# Patient Record
Sex: FEMALE | Race: WHITE | NOT HISPANIC OR LATINO | URBAN - METROPOLITAN AREA
[De-identification: names, ages, dates, MRNs, and addresses within clinical notes are randomized per-mention and may not be internally consistent; named-entity substitution may affect disease eponyms.]

---

## 2017-05-11 ENCOUNTER — OUTPATIENT (OUTPATIENT)
Dept: OUTPATIENT SERVICES | Facility: HOSPITAL | Age: 52
LOS: 1 days | Discharge: HOME | End: 2017-05-11

## 2017-05-18 ENCOUNTER — OUTPATIENT (OUTPATIENT)
Dept: OUTPATIENT SERVICES | Facility: HOSPITAL | Age: 52
LOS: 1 days | Discharge: HOME | End: 2017-05-18

## 2017-05-24 ENCOUNTER — OUTPATIENT (OUTPATIENT)
Dept: OUTPATIENT SERVICES | Facility: HOSPITAL | Age: 52
LOS: 1 days | Discharge: HOME | End: 2017-05-24

## 2017-06-28 DIAGNOSIS — K64.8 OTHER HEMORRHOIDS: ICD-10-CM

## 2017-06-28 DIAGNOSIS — D12.5 BENIGN NEOPLASM OF SIGMOID COLON: ICD-10-CM

## 2017-06-28 DIAGNOSIS — K52.9 NONINFECTIVE GASTROENTERITIS AND COLITIS, UNSPECIFIED: ICD-10-CM

## 2017-06-28 DIAGNOSIS — Z88.0 ALLERGY STATUS TO PENICILLIN: ICD-10-CM

## 2017-06-28 DIAGNOSIS — R19.7 DIARRHEA, UNSPECIFIED: ICD-10-CM

## 2017-06-28 DIAGNOSIS — Z91.040 LATEX ALLERGY STATUS: ICD-10-CM

## 2017-06-28 DIAGNOSIS — K57.30 DIVERTICULOSIS OF LARGE INTESTINE WITHOUT PERFORATION OR ABSCESS WITHOUT BLEEDING: ICD-10-CM

## 2017-07-28 DIAGNOSIS — D49.0 NEOPLASM OF UNSPECIFIED BEHAVIOR OF DIGESTIVE SYSTEM: ICD-10-CM

## 2017-07-28 DIAGNOSIS — K50.10 CROHN'S DISEASE OF LARGE INTESTINE WITHOUT COMPLICATIONS: ICD-10-CM

## 2017-09-16 ENCOUNTER — OUTPATIENT (OUTPATIENT)
Dept: OUTPATIENT SERVICES | Facility: HOSPITAL | Age: 52
LOS: 1 days | Discharge: HOME | End: 2017-09-16

## 2017-09-16 DIAGNOSIS — R00.1 BRADYCARDIA, UNSPECIFIED: ICD-10-CM

## 2017-09-16 DIAGNOSIS — R07.9 CHEST PAIN, UNSPECIFIED: ICD-10-CM

## 2017-09-16 DIAGNOSIS — K04.7 PERIAPICAL ABSCESS WITHOUT SINUS: ICD-10-CM

## 2017-09-16 DIAGNOSIS — K52.9 NONINFECTIVE GASTROENTERITIS AND COLITIS, UNSPECIFIED: ICD-10-CM

## 2017-09-24 ENCOUNTER — INPATIENT (INPATIENT)
Facility: HOSPITAL | Age: 52
LOS: 3 days | Discharge: HOME | End: 2017-09-28
Attending: INTERNAL MEDICINE | Admitting: INTERNAL MEDICINE

## 2017-09-24 DIAGNOSIS — R07.9 CHEST PAIN, UNSPECIFIED: ICD-10-CM

## 2017-09-24 DIAGNOSIS — R00.1 BRADYCARDIA, UNSPECIFIED: ICD-10-CM

## 2017-09-24 DIAGNOSIS — K04.7 PERIAPICAL ABSCESS WITHOUT SINUS: ICD-10-CM

## 2017-10-02 DIAGNOSIS — R07.9 CHEST PAIN, UNSPECIFIED: ICD-10-CM

## 2017-10-02 DIAGNOSIS — J45.909 UNSPECIFIED ASTHMA, UNCOMPLICATED: ICD-10-CM

## 2017-10-02 DIAGNOSIS — E03.9 HYPOTHYROIDISM, UNSPECIFIED: ICD-10-CM

## 2017-10-02 DIAGNOSIS — K27.9 PEPTIC ULCER, SITE UNSPECIFIED, UNSPECIFIED AS ACUTE OR CHRONIC, WITHOUT HEMORRHAGE OR PERFORATION: ICD-10-CM

## 2017-10-02 DIAGNOSIS — K44.9 DIAPHRAGMATIC HERNIA WITHOUT OBSTRUCTION OR GANGRENE: ICD-10-CM

## 2017-10-02 DIAGNOSIS — F17.200 NICOTINE DEPENDENCE, UNSPECIFIED, UNCOMPLICATED: ICD-10-CM

## 2017-10-02 DIAGNOSIS — K58.9 IRRITABLE BOWEL SYNDROME WITHOUT DIARRHEA: ICD-10-CM

## 2017-10-02 DIAGNOSIS — M54.2 CERVICALGIA: ICD-10-CM

## 2017-10-02 DIAGNOSIS — R00.1 BRADYCARDIA, UNSPECIFIED: ICD-10-CM

## 2017-12-04 DIAGNOSIS — A09 INFECTIOUS GASTROENTERITIS AND COLITIS, UNSPECIFIED: ICD-10-CM

## 2018-03-08 PROBLEM — Z00.00 ENCOUNTER FOR PREVENTIVE HEALTH EXAMINATION: Status: ACTIVE | Noted: 2018-03-08

## 2018-03-10 ENCOUNTER — OUTPATIENT (OUTPATIENT)
Dept: OUTPATIENT SERVICES | Facility: HOSPITAL | Age: 53
LOS: 1 days | Discharge: HOME | End: 2018-03-10

## 2018-03-10 DIAGNOSIS — J68.0 BRONCHITIS AND PNEUMONITIS DUE TO CHEMICALS, GASES, FUMES AND VAPORS: ICD-10-CM

## 2018-03-12 ENCOUNTER — APPOINTMENT (OUTPATIENT)
Dept: OTOLARYNGOLOGY | Facility: CLINIC | Age: 53
End: 2018-03-12
Payer: COMMERCIAL

## 2018-03-12 VITALS — HEIGHT: 62.5 IN | BODY MASS INDEX: 22.43 KG/M2 | WEIGHT: 125 LBS

## 2018-03-12 DIAGNOSIS — R51 HEADACHE: ICD-10-CM

## 2018-03-12 DIAGNOSIS — H61.23 IMPACTED CERUMEN, BILATERAL: ICD-10-CM

## 2018-03-12 DIAGNOSIS — J34.89 OTHER SPECIFIED DISORDERS OF NOSE AND NASAL SINUSES: ICD-10-CM

## 2018-03-12 PROCEDURE — 99204 OFFICE O/P NEW MOD 45 MIN: CPT | Mod: 25

## 2018-03-12 PROCEDURE — 69210 REMOVE IMPACTED EAR WAX UNI: CPT

## 2018-03-12 PROCEDURE — 31231 NASAL ENDOSCOPY DX: CPT

## 2018-03-17 ENCOUNTER — EMERGENCY (EMERGENCY)
Facility: HOSPITAL | Age: 53
LOS: 0 days | Discharge: HOME | End: 2018-03-17
Attending: EMERGENCY MEDICINE | Admitting: SPECIALIST

## 2018-03-17 VITALS
TEMPERATURE: 98 F | DIASTOLIC BLOOD PRESSURE: 86 MMHG | OXYGEN SATURATION: 98 % | HEART RATE: 59 BPM | SYSTOLIC BLOOD PRESSURE: 141 MMHG | RESPIRATION RATE: 16 BRPM

## 2018-03-17 VITALS
OXYGEN SATURATION: 98 % | RESPIRATION RATE: 20 BRPM | HEART RATE: 106 BPM | TEMPERATURE: 98 F | SYSTOLIC BLOOD PRESSURE: 151 MMHG | DIASTOLIC BLOOD PRESSURE: 73 MMHG

## 2018-03-17 DIAGNOSIS — Z88.8 ALLERGY STATUS TO OTHER DRUGS, MEDICAMENTS AND BIOLOGICAL SUBSTANCES: ICD-10-CM

## 2018-03-17 DIAGNOSIS — M27.2 INFLAMMATORY CONDITIONS OF JAWS: ICD-10-CM

## 2018-03-17 DIAGNOSIS — K08.89 OTHER SPECIFIED DISORDERS OF TEETH AND SUPPORTING STRUCTURES: ICD-10-CM

## 2018-03-17 DIAGNOSIS — Z88.0 ALLERGY STATUS TO PENICILLIN: ICD-10-CM

## 2018-03-17 DIAGNOSIS — T85.848S PAIN DUE TO OTHER INTERNAL PROSTHETIC DEVICES, IMPLANTS AND GRAFTS, SEQUELA: Chronic | ICD-10-CM

## 2018-03-17 LAB
ANION GAP SERPL CALC-SCNC: 13 MMOL/L — SIGNIFICANT CHANGE UP (ref 7–14)
APTT BLD: 24.8 SEC — LOW (ref 27–39.2)
BASOPHILS # BLD AUTO: 0.01 K/UL — SIGNIFICANT CHANGE UP (ref 0–0.2)
BASOPHILS NFR BLD AUTO: 0.1 % — SIGNIFICANT CHANGE UP (ref 0–1)
BUN SERPL-MCNC: 17 MG/DL — SIGNIFICANT CHANGE UP (ref 10–20)
CALCIUM SERPL-MCNC: 9.2 MG/DL — SIGNIFICANT CHANGE UP (ref 8.5–10.1)
CHLORIDE SERPL-SCNC: 102 MMOL/L — SIGNIFICANT CHANGE UP (ref 98–110)
CO2 SERPL-SCNC: 28 MMOL/L — SIGNIFICANT CHANGE UP (ref 17–32)
CREAT SERPL-MCNC: 0.8 MG/DL — SIGNIFICANT CHANGE UP (ref 0.7–1.5)
EOSINOPHIL # BLD AUTO: 0.08 K/UL — SIGNIFICANT CHANGE UP (ref 0–0.7)
EOSINOPHIL NFR BLD AUTO: 1.1 % — SIGNIFICANT CHANGE UP (ref 0–8)
GLUCOSE SERPL-MCNC: 107 MG/DL — SIGNIFICANT CHANGE UP (ref 70–110)
HCG SERPL QL: NEGATIVE — SIGNIFICANT CHANGE UP
HCT VFR BLD CALC: 40 % — SIGNIFICANT CHANGE UP (ref 37–47)
HGB BLD-MCNC: 13.4 G/DL — SIGNIFICANT CHANGE UP (ref 12–16)
IMM GRANULOCYTES NFR BLD AUTO: 0.4 % — HIGH (ref 0.1–0.3)
INR BLD: 1.05 RATIO — SIGNIFICANT CHANGE UP (ref 0.65–1.3)
LYMPHOCYTES # BLD AUTO: 1.52 K/UL — SIGNIFICANT CHANGE UP (ref 1.2–3.4)
LYMPHOCYTES # BLD AUTO: 21 % — SIGNIFICANT CHANGE UP (ref 20.5–51.1)
MCHC RBC-ENTMCNC: 28.6 PG — SIGNIFICANT CHANGE UP (ref 27–31)
MCHC RBC-ENTMCNC: 33.5 G/DL — SIGNIFICANT CHANGE UP (ref 32–37)
MCV RBC AUTO: 85.5 FL — SIGNIFICANT CHANGE UP (ref 81–99)
MONOCYTES # BLD AUTO: 0.48 K/UL — SIGNIFICANT CHANGE UP (ref 0.1–0.6)
MONOCYTES NFR BLD AUTO: 6.6 % — SIGNIFICANT CHANGE UP (ref 1.7–9.3)
NEUTROPHILS # BLD AUTO: 5.11 K/UL — SIGNIFICANT CHANGE UP (ref 1.4–6.5)
NEUTROPHILS NFR BLD AUTO: 70.8 % — SIGNIFICANT CHANGE UP (ref 42.2–75.2)
PLATELET # BLD AUTO: 254 K/UL — SIGNIFICANT CHANGE UP (ref 130–400)
POTASSIUM SERPL-MCNC: 4.2 MMOL/L — SIGNIFICANT CHANGE UP (ref 3.5–5)
POTASSIUM SERPL-SCNC: 4.2 MMOL/L — SIGNIFICANT CHANGE UP (ref 3.5–5)
PROTHROM AB SERPL-ACNC: 11.4 SEC — SIGNIFICANT CHANGE UP (ref 9.95–12.87)
RBC # BLD: 4.68 M/UL — SIGNIFICANT CHANGE UP (ref 4.2–5.4)
RBC # FLD: 13.6 % — SIGNIFICANT CHANGE UP (ref 11.5–14.5)
SODIUM SERPL-SCNC: 143 MMOL/L — SIGNIFICANT CHANGE UP (ref 135–146)
WBC # BLD: 7.23 K/UL — SIGNIFICANT CHANGE UP (ref 4.8–10.8)
WBC # FLD AUTO: 7.23 K/UL — SIGNIFICANT CHANGE UP (ref 4.8–10.8)

## 2018-03-17 RX ORDER — FAMOTIDINE 10 MG/ML
20 INJECTION INTRAVENOUS ONCE
Qty: 0 | Refills: 0 | Status: COMPLETED | OUTPATIENT
Start: 2018-03-17 | End: 2018-03-17

## 2018-03-17 RX ORDER — SODIUM CHLORIDE 9 MG/ML
1000 INJECTION INTRAMUSCULAR; INTRAVENOUS; SUBCUTANEOUS ONCE
Qty: 0 | Refills: 0 | Status: COMPLETED | OUTPATIENT
Start: 2018-03-17 | End: 2018-03-17

## 2018-03-17 RX ORDER — ONDANSETRON 8 MG/1
4 TABLET, FILM COATED ORAL ONCE
Qty: 0 | Refills: 0 | Status: COMPLETED | OUTPATIENT
Start: 2018-03-17 | End: 2018-03-17

## 2018-03-17 RX ORDER — ACETAMINOPHEN 500 MG
650 TABLET ORAL ONCE
Qty: 0 | Refills: 0 | Status: COMPLETED | OUTPATIENT
Start: 2018-03-17 | End: 2018-03-17

## 2018-03-17 RX ORDER — CIPROFLOXACIN LACTATE 400MG/40ML
1 VIAL (ML) INTRAVENOUS
Qty: 7 | Refills: 0 | OUTPATIENT
Start: 2018-03-17 | End: 2018-03-23

## 2018-03-17 RX ORDER — ACETAMINOPHEN 500 MG
975 TABLET ORAL ONCE
Qty: 0 | Refills: 0 | Status: COMPLETED | OUTPATIENT
Start: 2018-03-17 | End: 2018-03-17

## 2018-03-17 RX ADMIN — ONDANSETRON 4 MILLIGRAM(S): 8 TABLET, FILM COATED ORAL at 16:33

## 2018-03-17 RX ADMIN — Medication 975 MILLIGRAM(S): at 14:51

## 2018-03-17 RX ADMIN — ONDANSETRON 4 MILLIGRAM(S): 8 TABLET, FILM COATED ORAL at 22:14

## 2018-03-17 RX ADMIN — SODIUM CHLORIDE 4000 MILLILITER(S): 9 INJECTION INTRAMUSCULAR; INTRAVENOUS; SUBCUTANEOUS at 15:29

## 2018-03-17 RX ADMIN — FAMOTIDINE 20 MILLIGRAM(S): 10 INJECTION INTRAVENOUS at 15:29

## 2018-03-17 RX ADMIN — FAMOTIDINE 20 MILLIGRAM(S): 10 INJECTION INTRAVENOUS at 22:14

## 2018-03-17 RX ADMIN — Medication 100 MILLIGRAM(S): at 16:00

## 2018-03-17 RX ADMIN — Medication 650 MILLIGRAM(S): at 21:11

## 2018-03-17 NOTE — ED ADULT NURSE REASSESSMENT NOTE - NS ED NURSE REASSESS COMMENT FT1
patient received from day rn. patient just finished ct scan. patient appears upset about not receiving IV antibiotics and would like to speak with MD. Dental resident at the bedside and Dr Elizondo made aware. patient given support. call bell in reach.

## 2018-03-17 NOTE — ED PROVIDER NOTE - NS ED ROS FT
Eyes:  No visual changes  ENMT:  no otalgia. no sore throat.  (+) right mandible pain   Cardiac:  no chest pain. no sob.  Respiratory:  No cough or respiratory distress.  GI:  mild vomiting.  no diarrhea or abdominal pain.  MS:  (+) myalgias. (+) arthralgias   Neuro:  No headache or weakness  Skin:  no skin rash.   Endocrine: no diabetes

## 2018-03-17 NOTE — ED PROVIDER NOTE - MEDICAL DECISION MAKING DETAILS
Patient was signed out to me by Dr. River pending Dental consult recommendations and CT results.   CT results noted, patient was seen by dental on call twice, as recommended Levaquin is given and Pt is instructed well to f/u as out patient with her private dentist or in our dental clinic on this monday morning ( in less than 48 hours)  Pt verbalized understanding and agreed.

## 2018-03-17 NOTE — ED PROVIDER NOTE - PROGRESS NOTE DETAILS
I discussed patient with Dr. Freedman, dental resident who is evaluating the patient. she will contact Dr Bain and review the CT. signed out to Dr Clark f/u CT scan and dental consult, dispo. Patient remained stable in ED, improved well, CT results noted, patient was evaluated by Dental twice, recommended Levaquin and follow up as outpatient on this monday with her dentist or in our Dental clinic.   Patient remained awake, alert, o x 3, speaking in full sentences, ambulatory comfortable, tolerated Levaquin well.   Discussed with patient in detail and is instructed well to f/u as outpatient for further care of her gum pain and dental infection. Patient remained stable in ED, improved well, CT results noted, patient was evaluated by Dental twice, As per information, PT had allergic reaction to clindamycin, so clindamycin infusion was stopped, and was not given as per patient and Dr. River.   Dental recommended Levaquin and follow up as outpatient on this monday with her dentist or in our Dental clinic.   Patient remained awake, alert, o x 3, speaking in full sentences, ambulatory comfortable, tolerated Levaquin well.   Discussed with patient in detail and is instructed well to f/u as outpatient for further care of her gum pain and dental infection.

## 2018-03-17 NOTE — ED PROVIDER NOTE - OBJECTIVE STATEMENT
52 F pmh osteomyelitis of the maxilla 2 years ago requiring entire maxillary dental implants, dental implants of right mandible x 2 most recently a few months ago presents complaining of right maxillary pain. reports she saw her dentist dr de anda this am with dental xr demonstrating radiolucency surrounding the implants with concern for infection.  patient denies intraoral redness or drainage. no facial swelling. no sore throat. no otalgia. denies difficulty breathing.  reports myalgias and not feeling well.  no fever/chills.  patient sent to er for evaluation, possible iv antibiotics and further imaging

## 2018-03-17 NOTE — ED ADULT NURSE NOTE - OBJECTIVE STATEMENT
Pt presents to room 16a, A&Ox3, ambulatory at baseline without assistance, hx of esophagitis, coelitis, gerd, and extensive dental work.  pt had dental implants placed recently. developed pain to right lower jaw 2 days ago. saw her dentist, told she has an abscess and to come to the ED for iv antibiotics. denies any fevers at home. c/o nausea and pain to right lower jaw.

## 2018-03-17 NOTE — ED PROVIDER NOTE - ATTENDING CONTRIBUTION TO CARE
52F PMH gerd, gastritis, esophagitis, colitis, radical hysterectomy for dysplasia, R maxillary OM s/p dental implants, R mandibular implant few months, p/w R mandibular pain, sent for IV abx. pt states she had XR today which showed possible infection, sent to ED immediately. no fever. +body aches and chills and nausea. no vomiting. no cough. no ha, numbness, weakness. no cp, sob. no hx diabetes. on exam, AFVSS, R maxillary gums erythematous, edematous, ttp, no purulence or fluctuance, well evangelista nad, ncat, eomi, perrla, mmm, lctab, rrr nl s1s2 no mrg, abd soft ntnd, aaox3, no focal deficits, no le edema or calf ttp, a/p; Possible maxillary OM, will get labs, CT, dental consult, iv abx, pain control, re-eval.

## 2018-03-17 NOTE — CONSULT NOTE ADULT - SUBJECTIVE AND OBJECTIVE BOX
Patient is a 52y old  Female who presents with a chief complaint of     HPI: lower right side pain. Patient sent by her private dentist due to infection around dental implants in lower right side and for IV antibiotics. Patient was given IV clindamycin but was stopped when she had allergic reaction. Patient reports getting multiple dental implants on lower right quadrant (most recently few months ago). Patient reports tenderness to palpation on right submandibular area. Denies difficulty breathing, swallowing, fever, or nausea.      PAST MEDICAL & SURGICAL HISTORY:  Osteomyelitis of maxilla  Dental implant pain, sequela    (   ) heart valve replacement  (   ) joint replacement  (   ) pregnancy    MEDICATIONS  (STANDING):  famotidine    Tablet 20 milliGRAM(s) Oral once  levoFLOXacin IVPB 500 milliGRAM(s) IV Intermittent once  ondansetron   Disintegrating Tablet 4 milliGRAM(s) Oral Once    MEDICATIONS  (PRN):      REVIEW OF SYSTEMS      General:	    Skin/Breast:  	  Ophthalmologic:  	  ENMT:	    Respiratory and Thorax:  	  Cardiovascular:	    Gastrointestinal:	    Genitourinary:	    Musculoskeletal:	    Neurological:	    Psychiatric:	    Hematology/Lymphatics:	    Endocrine:	    Allergic/Immunologic:	    Allergies    clarithromycin (Unknown)  iodine (Unknown)  latex (Unknown)  Originally Entered as [Unknown] reaction to [shrimp] (Unknown)  penicillin (Unknown)  prochlorperazine (Unknown)    Intolerances        FAMILY HISTORY:      *SOCIAL HISTORY: (   ) Tobacco; (   ) ETOH    Vital Signs Last 24 Hrs  T(C): 36.3 (17 Mar 2018 16:43), Max: 36.6 (17 Mar 2018 11:00)  T(F): 97.3 (17 Mar 2018 16:43), Max: 97.9 (17 Mar 2018 11:00)  HR: 66 (17 Mar 2018 16:43) (66 - 106)  BP: 140/83 (17 Mar 2018 16:43) (140/83 - 151/73)  BP(mean): --  RR: 18 (17 Mar 2018 16:43) (18 - 20)  SpO2: 98% (17 Mar 2018 11:00) (98% - 98%)    LABS:                        13.4   7.23  )-----------( 254      ( 17 Mar 2018 13:09 )             40.0     03-17    143  |  102  |  17  ----------------------------<  107  4.2   |  28  |  0.8    Ca    9.2      17 Mar 2018 13:09      WBC Count: 7.23 K/uL [4.80 - 10.80] (03-17 @ 13:09)  Platelet Count - Automated: 254 K/uL [130 - 400] (03-17 @ 13:09)  INR: 1.05 ratio [0.65 - 1.30] (03-17 @ 13:09)      PT/INR - ( 17 Mar 2018 13:09 )   PT: 11.40 sec;   INR: 1.05 ratio         PTT - ( 17 Mar 2018 13:09 )  PTT:24.8 sec    Last Dental Visit: <<  >>    EOE:  No TMD, LAD, asymmetry, or swelling.    IOE:  A 6wde5ss erythematous edema noted next to tooth #27.                            hard/soft palate:  (   ) palatal torus, <<No pathology noted>>            tongue/FOM <<No pathology noted>>            labial/buccal mucosa <<No pathology noted>>           (   ) percussion           (   ) palpation           (   ) swelling            (   ) abscess           (   ) sinus tract    *DENTAL RADIOGRAPHS:    RADIOLOGY & ADDITIONAL STUDIES: CT scan report states "nondiagnostic evaluation of dental implants and surrounding superficial soft tissue secondary to severe beam hardening/streak artifact caused by dense implants"    *ASSESSMENT: Patient reports tenderness to palpation on right submandibular area and mild pain on lower right quadrant. Discussed with oral surgery resident and recommend prescribing peridex and levoquin.     *PLAN: Recommend prescribing peridex and levoquin. Patient will be monitored and decision on admission will be made after administering 1 dose of levoquin. Patient will followup with  (oral surgeon) on Monday    PROCEDURE:   Verbal and written consent given.  Anesthesia: <<    >>   Treatment: <<    >>     RECOMMENDATIONS:  1) << Patient will followup with  (oral surgeon) on Monday  >>  2) Dental F/U with outpatient dentist for comprehensive dental care.   3) If any difficulty swallowing/breathing, fever occur, return to ER.     Resident Name, pager # Patient is a 52y old  Female who presents with a chief complaint of     HPI: lower right side pain. Patient sent by her private dentist due to infection around dental implants in lower right side and for IV antibiotics. Patient was given IV clindamycin but was stopped when she had allergic reaction. Patient reports getting multiple dental implants on lower right quadrant (most recently few months ago). Patient reports tenderness to palpation on right submandibular area. Denies difficulty breathing, swallowing, fever, or nausea.      PAST MEDICAL & SURGICAL HISTORY:  Osteomyelitis of maxilla  Dental implant pain, sequela    (   ) heart valve replacement  (   ) joint replacement  (   ) pregnancy    MEDICATIONS  (STANDING):  famotidine    Tablet 20 milliGRAM(s) Oral once  levoFLOXacin IVPB 500 milliGRAM(s) IV Intermittent once  ondansetron   Disintegrating Tablet 4 milliGRAM(s) Oral Once    MEDICATIONS  (PRN):      REVIEW OF SYSTEMS      General:	    Skin/Breast:  	  Ophthalmologic:  	  ENMT:	    Respiratory and Thorax:  	  Cardiovascular:	    Gastrointestinal:	    Genitourinary:	    Musculoskeletal:	    Neurological:	    Psychiatric:	    Hematology/Lymphatics:	    Endocrine:	    Allergic/Immunologic:	    Allergies    clarithromycin (Unknown)  iodine (Unknown)  latex (Unknown)  Originally Entered as [Unknown] reaction to [shrimp] (Unknown)  penicillin (Unknown)  prochlorperazine (Unknown)    Intolerances        FAMILY HISTORY:      *SOCIAL HISTORY: (   ) Tobacco; (   ) ETOH    Vital Signs Last 24 Hrs  T(C): 36.3 (17 Mar 2018 16:43), Max: 36.6 (17 Mar 2018 11:00)  T(F): 97.3 (17 Mar 2018 16:43), Max: 97.9 (17 Mar 2018 11:00)  HR: 66 (17 Mar 2018 16:43) (66 - 106)  BP: 140/83 (17 Mar 2018 16:43) (140/83 - 151/73)  BP(mean): --  RR: 18 (17 Mar 2018 16:43) (18 - 20)  SpO2: 98% (17 Mar 2018 11:00) (98% - 98%)    LABS:                        13.4   7.23  )-----------( 254      ( 17 Mar 2018 13:09 )             40.0     03-17    143  |  102  |  17  ----------------------------<  107  4.2   |  28  |  0.8    Ca    9.2      17 Mar 2018 13:09      WBC Count: 7.23 K/uL [4.80 - 10.80] (03-17 @ 13:09)  Platelet Count - Automated: 254 K/uL [130 - 400] (03-17 @ 13:09)  INR: 1.05 ratio [0.65 - 1.30] (03-17 @ 13:09)      PT/INR - ( 17 Mar 2018 13:09 )   PT: 11.40 sec;   INR: 1.05 ratio         PTT - ( 17 Mar 2018 13:09 )  PTT:24.8 sec    Last Dental Visit: <<  >>    EOE:  No TMD, LAD, asymmetry, or swelling.    IOE:  A 2ali5nm erythematous edema noted next to tooth #27.                            hard/soft palate:  (   ) palatal torus, <<No pathology noted>>            tongue/FOM <<No pathology noted>>            labial/buccal mucosa <<No pathology noted>>           (   ) percussion           (   ) palpation           (   ) swelling            (   ) abscess           (   ) sinus tract    *DENTAL RADIOGRAPHS:    RADIOLOGY & ADDITIONAL STUDIES: CT scan report states "nondiagnostic evaluation of dental implants and surrounding superficial soft tissue secondary to severe beam hardening/streak artifact caused by dense implants"    *ASSESSMENT: Patient reports tenderness to palpation on right submandibular area and mild pain on lower right quadrant. Discussed with oral surgery resident and recommend prescribing peridex and levoquin.     *PLAN: Recommend prescribing peridex and levoquin. Patient will be monitored and decision on admission will be made after administering 1 dose of levoquin. Patient will followup with  (oral surgeon) on Monday.    UPDATE at 10:47pm after administering 1 dose of Levoquin: Patient did not have any allergic reaction to levoquin.  prescribed Levoquin for 7 days. Patient states that she feels better. Instructed patient to return to ER if any symptoms get worse and followup with  on Monday after calling the office. Patient states she understands and agrees.    PROCEDURE:   Verbal and written consent given.  Anesthesia: <<    >>   Treatment: <<    >>     RECOMMENDATIONS:  1) << Patient will followup with  (oral surgeon) on Monday  >>  2) Dental F/U with outpatient dentist for comprehensive dental care.   3) If any difficulty swallowing/breathing, fever occur, return to ER.     Resident Name, pager #

## 2018-03-17 NOTE — ED PROVIDER NOTE - PHYSICAL EXAMINATION
CONSTITUTIONAL: well-developed; well-nourished; in no acute distress.   SKIN: warm, dry  HEAD: Normocephalic; atraumatic.  EYES: no conj injection  ENT: No nasal discharge; no facial swelling.  no trismus. no right mandibular erythema or edema.  no palpable abscess. moist mucous membranes. airway clear.  NECK: Supple; non tender.  CARD: S1, S2 normal  RESP: clear to auscultation bilaterally   ABD: soft ntnd  EXT: Normal ROM.  normal gait. 2+ radial pulses   LYMPH: No acute cervical adenopathy.  NEURO: Alert, oriented, grossly unremarkable

## 2018-03-17 NOTE — ED ADULT NURSE REASSESSMENT NOTE - NS ED NURSE REASSESS COMMENT FT1
pt c/o nasal congestion and mild throat irritation after starting clindamycin, antibiotic stopped, md notified, pts vitals retaken, spo2 , no respiratory distress noted, no drooling or facial swelling, pt will be monitored closely.

## 2018-03-28 ENCOUNTER — APPOINTMENT (OUTPATIENT)
Dept: OTOLARYNGOLOGY | Facility: CLINIC | Age: 53
End: 2018-03-28

## 2018-06-01 ENCOUNTER — OUTPATIENT (OUTPATIENT)
Dept: OUTPATIENT SERVICES | Facility: HOSPITAL | Age: 53
LOS: 1 days | Discharge: HOME | End: 2018-06-01

## 2018-06-01 VITALS
SYSTOLIC BLOOD PRESSURE: 140 MMHG | HEART RATE: 76 BPM | OXYGEN SATURATION: 98 % | DIASTOLIC BLOOD PRESSURE: 76 MMHG | TEMPERATURE: 98 F | RESPIRATION RATE: 16 BRPM

## 2018-06-01 DIAGNOSIS — T85.848S PAIN DUE TO OTHER INTERNAL PROSTHETIC DEVICES, IMPLANTS AND GRAFTS, SEQUELA: Chronic | ICD-10-CM

## 2018-06-01 DIAGNOSIS — D21.5 BENIGN NEOPLASM OF CONNECTIVE AND OTHER SOFT TISSUE OF PELVIS: ICD-10-CM

## 2018-06-01 DIAGNOSIS — Z01.818 ENCOUNTER FOR OTHER PREPROCEDURAL EXAMINATION: ICD-10-CM

## 2018-06-01 DIAGNOSIS — Z90.710 ACQUIRED ABSENCE OF BOTH CERVIX AND UTERUS: Chronic | ICD-10-CM

## 2018-06-01 DIAGNOSIS — Z98.890 OTHER SPECIFIED POSTPROCEDURAL STATES: Chronic | ICD-10-CM

## 2018-06-01 LAB
ALBUMIN SERPL ELPH-MCNC: 4.4 G/DL — SIGNIFICANT CHANGE UP (ref 3.5–5.2)
ALP SERPL-CCNC: 50 U/L — SIGNIFICANT CHANGE UP (ref 30–115)
ALT FLD-CCNC: 30 U/L — SIGNIFICANT CHANGE UP (ref 0–41)
ANION GAP SERPL CALC-SCNC: 12 MMOL/L — SIGNIFICANT CHANGE UP (ref 7–14)
APTT BLD: 25.1 SEC — LOW (ref 27–39.2)
AST SERPL-CCNC: 30 U/L — SIGNIFICANT CHANGE UP (ref 0–41)
BASOPHILS # BLD AUTO: 0.01 K/UL — SIGNIFICANT CHANGE UP (ref 0–0.2)
BASOPHILS NFR BLD AUTO: 0.1 % — SIGNIFICANT CHANGE UP (ref 0–1)
BILIRUB SERPL-MCNC: <0.2 MG/DL — SIGNIFICANT CHANGE UP (ref 0.2–1.2)
BUN SERPL-MCNC: 17 MG/DL — SIGNIFICANT CHANGE UP (ref 10–20)
CALCIUM SERPL-MCNC: 9 MG/DL — SIGNIFICANT CHANGE UP (ref 8.5–10.1)
CHLORIDE SERPL-SCNC: 102 MMOL/L — SIGNIFICANT CHANGE UP (ref 98–110)
CO2 SERPL-SCNC: 29 MMOL/L — SIGNIFICANT CHANGE UP (ref 17–32)
CREAT SERPL-MCNC: 0.8 MG/DL — SIGNIFICANT CHANGE UP (ref 0.7–1.5)
EOSINOPHIL # BLD AUTO: 0.06 K/UL — SIGNIFICANT CHANGE UP (ref 0–0.7)
EOSINOPHIL NFR BLD AUTO: 0.9 % — SIGNIFICANT CHANGE UP (ref 0–8)
GLUCOSE SERPL-MCNC: 83 MG/DL — SIGNIFICANT CHANGE UP (ref 70–99)
HCT VFR BLD CALC: 37.9 % — SIGNIFICANT CHANGE UP (ref 37–47)
HGB BLD-MCNC: 12.8 G/DL — SIGNIFICANT CHANGE UP (ref 12–16)
IMM GRANULOCYTES NFR BLD AUTO: 0.4 % — HIGH (ref 0.1–0.3)
INR BLD: 1.01 RATIO — SIGNIFICANT CHANGE UP (ref 0.65–1.3)
LYMPHOCYTES # BLD AUTO: 1.97 K/UL — SIGNIFICANT CHANGE UP (ref 1.2–3.4)
LYMPHOCYTES # BLD AUTO: 28.6 % — SIGNIFICANT CHANGE UP (ref 20.5–51.1)
MCHC RBC-ENTMCNC: 28.8 PG — SIGNIFICANT CHANGE UP (ref 27–31)
MCHC RBC-ENTMCNC: 33.8 G/DL — SIGNIFICANT CHANGE UP (ref 32–37)
MCV RBC AUTO: 85.2 FL — SIGNIFICANT CHANGE UP (ref 81–99)
MONOCYTES # BLD AUTO: 0.53 K/UL — SIGNIFICANT CHANGE UP (ref 0.1–0.6)
MONOCYTES NFR BLD AUTO: 7.7 % — SIGNIFICANT CHANGE UP (ref 1.7–9.3)
NEUTROPHILS # BLD AUTO: 4.3 K/UL — SIGNIFICANT CHANGE UP (ref 1.4–6.5)
NEUTROPHILS NFR BLD AUTO: 62.3 % — SIGNIFICANT CHANGE UP (ref 42.2–75.2)
NRBC # BLD: 0 /100 WBCS — SIGNIFICANT CHANGE UP (ref 0–0)
PLATELET # BLD AUTO: 271 K/UL — SIGNIFICANT CHANGE UP (ref 130–400)
POTASSIUM SERPL-MCNC: 4.4 MMOL/L — SIGNIFICANT CHANGE UP (ref 3.5–5)
POTASSIUM SERPL-SCNC: 4.4 MMOL/L — SIGNIFICANT CHANGE UP (ref 3.5–5)
PROT SERPL-MCNC: 7 G/DL — SIGNIFICANT CHANGE UP (ref 6–8)
PROTHROM AB SERPL-ACNC: 10.9 SEC — SIGNIFICANT CHANGE UP (ref 9.95–12.87)
RBC # BLD: 4.45 M/UL — SIGNIFICANT CHANGE UP (ref 4.2–5.4)
RBC # FLD: 12.4 % — SIGNIFICANT CHANGE UP (ref 11.5–14.5)
SODIUM SERPL-SCNC: 143 MMOL/L — SIGNIFICANT CHANGE UP (ref 135–146)
WBC # BLD: 6.9 K/UL — SIGNIFICANT CHANGE UP (ref 4.8–10.8)
WBC # FLD AUTO: 6.9 K/UL — SIGNIFICANT CHANGE UP (ref 4.8–10.8)

## 2018-06-01 NOTE — H&P PST ADULT - PSH
Dental implant pain, sequela    History of hysterectomy    S/P D&C (status post dilation and curettage)  2

## 2018-06-01 NOTE — H&P PST ADULT - PMH
Anxiety    Asthma  STABLE, EPISODIC, USES VENTOLIN ONCE Q 2M  Dysplasia of cervix  2016  Osteomyelitis of maxilla    Seasonal allergies    Tendonitis  LT [facility]ES

## 2018-06-01 NOTE — H&P PST ADULT - HISTORY OF PRESENT ILLNESS
52YOF, States found discharge from LT GROIN CYST FEW DAYS AGO , PRESENTS TO PRETESTING FOR CYST EXCISION. Denies c/o cp , palp, sob, uri , fever, rash or uti symptoms Exercise kristal 3-4 FOS

## 2018-06-04 DIAGNOSIS — J45.909 UNSPECIFIED ASTHMA, UNCOMPLICATED: ICD-10-CM

## 2018-06-04 DIAGNOSIS — M32.9 SYSTEMIC LUPUS ERYTHEMATOSUS, UNSPECIFIED: ICD-10-CM

## 2018-06-06 ENCOUNTER — RESULT REVIEW (OUTPATIENT)
Age: 53
End: 2018-06-06

## 2018-06-06 ENCOUNTER — OUTPATIENT (OUTPATIENT)
Dept: OUTPATIENT SERVICES | Facility: HOSPITAL | Age: 53
LOS: 1 days | Discharge: HOME | End: 2018-06-06

## 2018-06-06 VITALS
TEMPERATURE: 98 F | RESPIRATION RATE: 18 BRPM | SYSTOLIC BLOOD PRESSURE: 132 MMHG | WEIGHT: 125 LBS | HEIGHT: 62 IN | HEART RATE: 62 BPM | DIASTOLIC BLOOD PRESSURE: 79 MMHG

## 2018-06-06 VITALS — DIASTOLIC BLOOD PRESSURE: 73 MMHG | HEART RATE: 50 BPM | SYSTOLIC BLOOD PRESSURE: 142 MMHG | RESPIRATION RATE: 18 BRPM

## 2018-06-06 DIAGNOSIS — T85.848S PAIN DUE TO OTHER INTERNAL PROSTHETIC DEVICES, IMPLANTS AND GRAFTS, SEQUELA: Chronic | ICD-10-CM

## 2018-06-06 DIAGNOSIS — Z90.710 ACQUIRED ABSENCE OF BOTH CERVIX AND UTERUS: Chronic | ICD-10-CM

## 2018-06-06 DIAGNOSIS — Z98.890 OTHER SPECIFIED POSTPROCEDURAL STATES: Chronic | ICD-10-CM

## 2018-06-06 RX ORDER — ONDANSETRON 8 MG/1
4 TABLET, FILM COATED ORAL ONCE
Qty: 0 | Refills: 0 | Status: COMPLETED | OUTPATIENT
Start: 2018-06-06 | End: 2018-06-06

## 2018-06-06 RX ORDER — MORPHINE SULFATE 50 MG/1
4 CAPSULE, EXTENDED RELEASE ORAL
Qty: 0 | Refills: 0 | Status: DISCONTINUED | OUTPATIENT
Start: 2018-06-06 | End: 2018-06-06

## 2018-06-06 RX ORDER — SODIUM CHLORIDE 9 MG/ML
1000 INJECTION, SOLUTION INTRAVENOUS
Qty: 0 | Refills: 0 | Status: DISCONTINUED | OUTPATIENT
Start: 2018-06-06 | End: 2018-06-06

## 2018-06-06 RX ORDER — TRAMADOL HYDROCHLORIDE 50 MG/1
1 TABLET ORAL
Qty: 15 | Refills: 0
Start: 2018-06-06 | End: 2018-06-10

## 2018-06-06 RX ORDER — ONDANSETRON 8 MG/1
4 TABLET, FILM COATED ORAL ONCE
Qty: 0 | Refills: 0 | Status: DISCONTINUED | OUTPATIENT
Start: 2018-06-06 | End: 2018-06-21

## 2018-06-06 RX ADMIN — ONDANSETRON 4 MILLIGRAM(S): 8 TABLET, FILM COATED ORAL at 09:35

## 2018-06-06 RX ADMIN — ONDANSETRON 4 MILLIGRAM(S): 8 TABLET, FILM COATED ORAL at 10:15

## 2018-06-06 NOTE — ASU DISCHARGE PLAN (ADULT/PEDIATRIC). - NOTIFY
Swelling that continues/Bleeding that does not stop/Numbness, color, or temperature change to extremity/Fever greater than 101

## 2018-06-06 NOTE — BRIEF OPERATIVE NOTE - PROCEDURE
<<-----Click on this checkbox to enter Procedure Excision of cyst of left groin  06/06/2018    Active  [id number]

## 2018-06-06 NOTE — PRE-ANESTHESIA EVALUATION ADULT - NSANTHADDINFOFT_GEN_ALL_CORE
MAC. discussed with the patient all the risks, benefits, alternatives, complications. all questions answered. willing to proceed

## 2018-06-06 NOTE — PRE-ANESTHESIA EVALUATION ADULT - NSANTHOSAYNRD_GEN_A_CORE
No. REYNA screening performed.  STOP BANG Legend: 0-2 = LOW Risk; 3-4 = INTERMEDIATE Risk; 5-8 = HIGH Risk

## 2018-06-07 LAB — SURGICAL PATHOLOGY STUDY: SIGNIFICANT CHANGE UP

## 2018-06-18 DIAGNOSIS — L05.01 PILONIDAL CYST WITH ABSCESS: ICD-10-CM

## 2018-06-18 DIAGNOSIS — F17.210 NICOTINE DEPENDENCE, CIGARETTES, UNCOMPLICATED: ICD-10-CM

## 2018-06-18 DIAGNOSIS — J45.909 UNSPECIFIED ASTHMA, UNCOMPLICATED: ICD-10-CM

## 2018-06-18 DIAGNOSIS — Z91.040 LATEX ALLERGY STATUS: ICD-10-CM

## 2018-06-18 DIAGNOSIS — F41.9 ANXIETY DISORDER, UNSPECIFIED: ICD-10-CM

## 2018-06-18 DIAGNOSIS — Z91.041 RADIOGRAPHIC DYE ALLERGY STATUS: ICD-10-CM

## 2018-10-18 ENCOUNTER — EMERGENCY (EMERGENCY)
Facility: HOSPITAL | Age: 53
LOS: 0 days | Discharge: HOME | End: 2018-10-18
Attending: EMERGENCY MEDICINE | Admitting: EMERGENCY MEDICINE

## 2018-10-18 VITALS
TEMPERATURE: 96 F | HEART RATE: 52 BPM | OXYGEN SATURATION: 100 % | DIASTOLIC BLOOD PRESSURE: 66 MMHG | HEIGHT: 62 IN | RESPIRATION RATE: 19 BRPM | WEIGHT: 126.99 LBS | SYSTOLIC BLOOD PRESSURE: 145 MMHG

## 2018-10-18 VITALS
DIASTOLIC BLOOD PRESSURE: 71 MMHG | SYSTOLIC BLOOD PRESSURE: 165 MMHG | HEART RATE: 61 BPM | RESPIRATION RATE: 18 BRPM | OXYGEN SATURATION: 99 %

## 2018-10-18 DIAGNOSIS — E03.9 HYPOTHYROIDISM, UNSPECIFIED: ICD-10-CM

## 2018-10-18 DIAGNOSIS — Z79.51 LONG TERM (CURRENT) USE OF INHALED STEROIDS: ICD-10-CM

## 2018-10-18 DIAGNOSIS — J45.909 UNSPECIFIED ASTHMA, UNCOMPLICATED: ICD-10-CM

## 2018-10-18 DIAGNOSIS — T50.B95A ADVERSE EFFECT OF OTHER VIRAL VACCINES, INITIAL ENCOUNTER: ICD-10-CM

## 2018-10-18 DIAGNOSIS — Z79.2 LONG TERM (CURRENT) USE OF ANTIBIOTICS: ICD-10-CM

## 2018-10-18 DIAGNOSIS — Z98.890 OTHER SPECIFIED POSTPROCEDURAL STATES: Chronic | ICD-10-CM

## 2018-10-18 DIAGNOSIS — T85.848S PAIN DUE TO OTHER INTERNAL PROSTHETIC DEVICES, IMPLANTS AND GRAFTS, SEQUELA: Chronic | ICD-10-CM

## 2018-10-18 DIAGNOSIS — L50.0 ALLERGIC URTICARIA: ICD-10-CM

## 2018-10-18 DIAGNOSIS — Z98.890 OTHER SPECIFIED POSTPROCEDURAL STATES: ICD-10-CM

## 2018-10-18 DIAGNOSIS — Z79.899 OTHER LONG TERM (CURRENT) DRUG THERAPY: ICD-10-CM

## 2018-10-18 DIAGNOSIS — L29.9 PRURITUS, UNSPECIFIED: ICD-10-CM

## 2018-10-18 DIAGNOSIS — Z90.710 ACQUIRED ABSENCE OF BOTH CERVIX AND UTERUS: Chronic | ICD-10-CM

## 2018-10-18 DIAGNOSIS — R11.0 NAUSEA: ICD-10-CM

## 2018-10-18 DIAGNOSIS — Z90.710 ACQUIRED ABSENCE OF BOTH CERVIX AND UTERUS: ICD-10-CM

## 2018-10-18 PROBLEM — N87.9 DYSPLASIA OF CERVIX UTERI, UNSPECIFIED: Chronic | Status: ACTIVE | Noted: 2018-06-01

## 2018-10-18 PROBLEM — M27.2 INFLAMMATORY CONDITIONS OF JAWS: Chronic | Status: ACTIVE | Noted: 2018-03-17

## 2018-10-18 PROBLEM — J30.2 OTHER SEASONAL ALLERGIC RHINITIS: Chronic | Status: ACTIVE | Noted: 2018-06-01

## 2018-10-18 PROBLEM — M77.9 ENTHESOPATHY, UNSPECIFIED: Chronic | Status: ACTIVE | Noted: 2018-06-01

## 2018-10-18 RX ORDER — ONDANSETRON 8 MG/1
4 TABLET, FILM COATED ORAL ONCE
Qty: 0 | Refills: 0 | Status: COMPLETED | OUTPATIENT
Start: 2018-10-18 | End: 2018-10-18

## 2018-10-18 RX ORDER — EPINEPHRINE 0.3 MG/.3ML
0.3 INJECTION INTRAMUSCULAR; SUBCUTANEOUS
Qty: 1 | Refills: 0
Start: 2018-10-18 | End: 2018-10-19

## 2018-10-18 RX ORDER — FAMOTIDINE 10 MG/ML
20 INJECTION INTRAVENOUS ONCE
Qty: 0 | Refills: 0 | Status: COMPLETED | OUTPATIENT
Start: 2018-10-18 | End: 2018-10-18

## 2018-10-18 RX ADMIN — ONDANSETRON 4 MILLIGRAM(S): 8 TABLET, FILM COATED ORAL at 13:31

## 2018-10-18 RX ADMIN — FAMOTIDINE 20 MILLIGRAM(S): 10 INJECTION INTRAVENOUS at 13:31

## 2018-10-18 NOTE — ED PROVIDER NOTE - PHYSICAL EXAMINATION
GENERAL:  well appearing, non-toxic female in no acute distress  SKIN: skin warm, pink and dry. MMM. no visible rash or urticaria. site of influenza vaccine unremarkable. cap refill < 2 sec  ENT:  Airway intact. Patent oropharynx without erythema or exudate. Uvula midline. no facial swelling, lip/tongue swelling, uvula edema. TMs clear b/l.   PULM: CTAB. Normal respiratory effort. No respiratory distress. No wheezes, stridor, rales or rhonchi. No retractions  CV: RRR, no M/R/G.   ABD: Soft, non-tender, non-distended  MSK: FROM of all extremities.  No MSK tenderness. No edema, erythema, cyanosis. Distal pulses intact.  NEURO: A+Ox3, no sensory/motor deficits, CN II-XII intact. No speech slurring, pronator drift, facial asymmetry. Normal finger-to-nose  b/l. 5/5 strength throughout. Normal gait. Negative Romberg.

## 2018-10-18 NOTE — ED PROVIDER NOTE - OBJECTIVE STATEMENT
54 yo female with h/o hypothyroidism, asthma BIBA for allergic reaction. Patient was at her pmd's office Dr. Rai for routine evaluation at 11:15 today. Was given the flu vaccine in right arm, minutes after started feeling itchy with hives and "scratchy" throat. EMS was called who gave patient 50 mg benadrly and 10 mg IV decadron with improvement of symptoms. Patient states she now feels tired and slightly nausea. Denies fever, chills, headache, dizziness, visual changes, chest pain, sob, abd pain, vomiting, diarrhea, UE/LE weakness or paresthesias, change in voice, excessive drooling, throat pain.

## 2018-10-18 NOTE — ED PROVIDER NOTE - ATTENDING CONTRIBUTION TO CARE
52 yo female with h/o hypothyroidism, asthma BIBA for allergic reaction after she was given a flu shot, patient given treatment in the office, steroids, benadryl, no epi, upon arrival to the ed, patient asymptomatic, no current cp/sob, no n/v/d, no loc, no fever    CONSTITUTIONAL: Well-developed; well-nourished; in no acute distress. Sitting up and providing appropriate history and physical examination  SKIN: skin exam is warm and dry, no acute rash.  HEAD: Normocephalic; atraumatic.  EYES: PERRL, 3 mm bilateral, no nystagmus, EOM intact; conjunctiva and sclera clear.  ENT: No nasal discharge; airway clear.  NECK: Supple; non tender. + full passive ROM in all directions. No JVD  CARD: S1, S2 normal; no murmurs, gallops, or rubs. Regular rate and rhythm. + Symmetric Strong Pulses  RESP: No wheezes, rales or rhonchi. Good air movement bilaterally  ABD: soft; non-distended; non-tender. No Rebound, No Guarding, No signs of peritonitis, No CVA tenderness. No pulsatile abdominal mass. + Strong and Symmetric Pulses  EXT: Normal ROM. No clubbing, cyanosis or edema. Dp and Pt Pulses intact. Cap refill less than 3 seconds  NEURO: Alert, oriented, grossly unremarkable. No Focal deficits. GCS 15. NIH 0  PSYCH: Cooperative, appropriate.     Patient states she feels much better and would like to go home

## 2018-10-18 NOTE — ED ADULT TRIAGE NOTE - CHIEF COMPLAINT QUOTE
patient was at her internist office and received flu shot and experienced a reaction of itchyness/ hives and redness, BIBA via [employer] had IVL placed into left AC and was given steroid IV and benadryl.

## 2018-10-18 NOTE — ED PROVIDER NOTE - PROGRESS NOTE DETAILS
Patient feeling significantly better, no facial swelling/lip/tongue swelling, no rash, no hives. Patient understands return precautions. Will d/c with epi pen

## 2018-10-18 NOTE — ED PROVIDER NOTE - NS ED ROS FT
Constitutional: no fever, chills  Eyes: no visual changes  ENT: no URI sxs, no throat pain, no change in voice, no excessive drooling  Cardiovascular: no chest pain, no sob, no syncope   Respiratory: no cough, no shortness of breath, no h/o asthma or COPD.  Gastrointestinal: + mild nausea. No vomiting or diarrhea. no abdominal pain.  Musculoskeletal: no msk pain or injury. no neck pain, no back pain, no joint pain.    Integumentary: + rash, + pruritis.   Neurological: no headache, no dizziness, no visual changes, no UE/LE weakness or paresthesias. no change in mental status. no neck pain or stiffness.

## 2019-05-21 ENCOUNTER — APPOINTMENT (OUTPATIENT)
Dept: ORTHOPEDIC SURGERY | Facility: CLINIC | Age: 54
End: 2019-05-21
Payer: COMMERCIAL

## 2019-05-21 VITALS
BODY MASS INDEX: 23.04 KG/M2 | DIASTOLIC BLOOD PRESSURE: 85 MMHG | HEART RATE: 87 BPM | WEIGHT: 130 LBS | HEIGHT: 63 IN | SYSTOLIC BLOOD PRESSURE: 146 MMHG

## 2019-05-21 DIAGNOSIS — Z87.410 PERSONAL HISTORY OF CERVICAL DYSPLASIA: ICD-10-CM

## 2019-05-21 DIAGNOSIS — Z78.9 OTHER SPECIFIED HEALTH STATUS: ICD-10-CM

## 2019-05-21 DIAGNOSIS — Z86.79 PERSONAL HISTORY OF OTHER DISEASES OF THE CIRCULATORY SYSTEM: ICD-10-CM

## 2019-05-21 DIAGNOSIS — Z80.9 FAMILY HISTORY OF MALIGNANT NEOPLASM, UNSPECIFIED: ICD-10-CM

## 2019-05-21 DIAGNOSIS — M25.512 PAIN IN LEFT SHOULDER: ICD-10-CM

## 2019-05-21 DIAGNOSIS — Z82.61 FAMILY HISTORY OF ARTHRITIS: ICD-10-CM

## 2019-05-21 DIAGNOSIS — Z56.0 UNEMPLOYMENT, UNSPECIFIED: ICD-10-CM

## 2019-05-21 DIAGNOSIS — Z87.09 PERSONAL HISTORY OF OTHER DISEASES OF THE RESPIRATORY SYSTEM: ICD-10-CM

## 2019-05-21 PROCEDURE — 99203 OFFICE O/P NEW LOW 30 MIN: CPT

## 2019-05-21 PROCEDURE — 73030 X-RAY EXAM OF SHOULDER: CPT | Mod: LT

## 2019-05-21 SDOH — ECONOMIC STABILITY - INCOME SECURITY: UNEMPLOYMENT, UNSPECIFIED: Z56.0

## 2019-05-22 PROBLEM — M25.512 ACUTE PAIN OF LEFT SHOULDER: Status: ACTIVE | Noted: 2019-05-22

## 2019-05-22 NOTE — PHYSICAL EXAM
[de-identified] : Oriented to time, place, person\par Mood: Normal\par Affect: Normal\par Appearance: Healthy, well appearing, no acute distress.\par Gait: Normal\par Assistive Devices: None\par \par Left shoulder exam\par \par Inspection: No malalignment, No defects, No atrophy\par Skin: No masses, No lesions\par Neck: Negative Spurlings, full ROM, no pain with ROM\par AROM: FF to 180, abduction to 90, ER to 70, IR to mid lumbar\par PROM: Full\par Painful arc ROM: Painful external rotation, internal rotation\par Tenderness: No bicipital tenderness, posterior tenderness to the greater tuberosity/RTC insertion, no anterior shoulder/lesser tuberosity tenderness\par Strength: 4/5 ER with pain, 5/5 IR in adduction, 4+/5 supraspinatus testing, positive O'Briens test\par AC Joint: No ttp/pain with cross arm testing\par Biceps: Speed Negative, Yergusons Negative\par Impingement test: Mild Greenwood, Positive Neer \par Stability: Stable\par Vasc: 2+ radial pulse\par Neuro: AIN, PIN, Ulnar nerve in tact to motor\par Sensation: In tact to light touch throughout\par \par  [de-identified] : \par The following radiographs were ordered and read by me during this patients visit. I reviewed each radiograph in detail with the patient and discussed the findings as highlighted below. \par \par 3 views of the left shoulder were obtained today that show no acute fracture or dislocation. There is no glenohumeral and no AC joint degenerative change seen. Type I acromion. There is no significant malalignment. No significant other obvious osseous abnormality, otherwise unremarkable.\par \par MRI left shoulder reviewed that shows a high-grade partial infraspinatus tear through the posterior shoulder. Mild impingement.
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
## 2019-06-03 ENCOUNTER — OUTPATIENT (OUTPATIENT)
Dept: OUTPATIENT SERVICES | Facility: HOSPITAL | Age: 54
LOS: 1 days | End: 2019-06-03
Payer: COMMERCIAL

## 2019-06-03 VITALS
RESPIRATION RATE: 14 BRPM | DIASTOLIC BLOOD PRESSURE: 70 MMHG | HEIGHT: 61.5 IN | SYSTOLIC BLOOD PRESSURE: 118 MMHG | HEART RATE: 63 BPM | WEIGHT: 138.01 LBS | TEMPERATURE: 97 F | OXYGEN SATURATION: 99 %

## 2019-06-03 DIAGNOSIS — M75.42 IMPINGEMENT SYNDROME OF LEFT SHOULDER: ICD-10-CM

## 2019-06-03 DIAGNOSIS — E03.9 HYPOTHYROIDISM, UNSPECIFIED: ICD-10-CM

## 2019-06-03 DIAGNOSIS — M75.112 INCOMPLETE ROTATOR CUFF TEAR OR RUPTURE OF LEFT SHOULDER, NOT SPECIFIED AS TRAUMATIC: ICD-10-CM

## 2019-06-03 DIAGNOSIS — T85.848S PAIN DUE TO OTHER INTERNAL PROSTHETIC DEVICES, IMPLANTS AND GRAFTS, SEQUELA: Chronic | ICD-10-CM

## 2019-06-03 DIAGNOSIS — Z98.890 OTHER SPECIFIED POSTPROCEDURAL STATES: Chronic | ICD-10-CM

## 2019-06-03 DIAGNOSIS — Z91.89 OTHER SPECIFIED PERSONAL RISK FACTORS, NOT ELSEWHERE CLASSIFIED: ICD-10-CM

## 2019-06-03 DIAGNOSIS — J45.909 UNSPECIFIED ASTHMA, UNCOMPLICATED: ICD-10-CM

## 2019-06-03 DIAGNOSIS — R06.83 SNORING: ICD-10-CM

## 2019-06-03 DIAGNOSIS — Z90.710 ACQUIRED ABSENCE OF BOTH CERVIX AND UTERUS: Chronic | ICD-10-CM

## 2019-06-03 LAB
ALBUMIN SERPL ELPH-MCNC: 4.2 G/DL — SIGNIFICANT CHANGE UP (ref 3.3–5)
ALP SERPL-CCNC: 37 U/L — LOW (ref 40–120)
ALT FLD-CCNC: 33 U/L — SIGNIFICANT CHANGE UP (ref 4–33)
ANION GAP SERPL CALC-SCNC: 14 MMO/L — SIGNIFICANT CHANGE UP (ref 7–14)
AST SERPL-CCNC: 23 U/L — SIGNIFICANT CHANGE UP (ref 4–32)
BILIRUB SERPL-MCNC: < 0.2 MG/DL — LOW (ref 0.2–1.2)
BUN SERPL-MCNC: 14 MG/DL — SIGNIFICANT CHANGE UP (ref 7–23)
CALCIUM SERPL-MCNC: 10.1 MG/DL — SIGNIFICANT CHANGE UP (ref 8.4–10.5)
CHLORIDE SERPL-SCNC: 99 MMOL/L — SIGNIFICANT CHANGE UP (ref 98–107)
CO2 SERPL-SCNC: 26 MMOL/L — SIGNIFICANT CHANGE UP (ref 22–31)
CREAT SERPL-MCNC: 0.74 MG/DL — SIGNIFICANT CHANGE UP (ref 0.5–1.3)
GLUCOSE SERPL-MCNC: 76 MG/DL — SIGNIFICANT CHANGE UP (ref 70–99)
HCT VFR BLD CALC: 42.7 % — SIGNIFICANT CHANGE UP (ref 34.5–45)
HGB BLD-MCNC: 13.9 G/DL — SIGNIFICANT CHANGE UP (ref 11.5–15.5)
MCHC RBC-ENTMCNC: 28.4 PG — SIGNIFICANT CHANGE UP (ref 27–34)
MCHC RBC-ENTMCNC: 32.6 % — SIGNIFICANT CHANGE UP (ref 32–36)
MCV RBC AUTO: 87.3 FL — SIGNIFICANT CHANGE UP (ref 80–100)
NRBC # FLD: 0 K/UL — SIGNIFICANT CHANGE UP (ref 0–0)
PLATELET # BLD AUTO: 276 K/UL — SIGNIFICANT CHANGE UP (ref 150–400)
PMV BLD: 10.8 FL — SIGNIFICANT CHANGE UP (ref 7–13)
POTASSIUM SERPL-MCNC: 3.9 MMOL/L — SIGNIFICANT CHANGE UP (ref 3.5–5.3)
POTASSIUM SERPL-SCNC: 3.9 MMOL/L — SIGNIFICANT CHANGE UP (ref 3.5–5.3)
PROT SERPL-MCNC: 6.9 G/DL — SIGNIFICANT CHANGE UP (ref 6–8.3)
RBC # BLD: 4.89 M/UL — SIGNIFICANT CHANGE UP (ref 3.8–5.2)
RBC # FLD: 12.7 % — SIGNIFICANT CHANGE UP (ref 10.3–14.5)
SODIUM SERPL-SCNC: 139 MMOL/L — SIGNIFICANT CHANGE UP (ref 135–145)
WBC # BLD: 8.92 K/UL — SIGNIFICANT CHANGE UP (ref 3.8–10.5)
WBC # FLD AUTO: 8.92 K/UL — SIGNIFICANT CHANGE UP (ref 3.8–10.5)

## 2019-06-03 PROCEDURE — 93010 ELECTROCARDIOGRAM REPORT: CPT | Mod: 76

## 2019-06-03 RX ORDER — LEVOTHYROXINE SODIUM 125 MCG
1 TABLET ORAL
Qty: 0 | Refills: 0 | DISCHARGE

## 2019-06-03 RX ORDER — MONTELUKAST 4 MG/1
1 TABLET, CHEWABLE ORAL
Qty: 0 | Refills: 0 | DISCHARGE

## 2019-06-03 RX ORDER — ESTROGENS, CONJUGATED 0.625 MG
0 TABLET ORAL
Qty: 0 | Refills: 0 | DISCHARGE

## 2019-06-03 RX ORDER — CLONAZEPAM 1 MG
1 TABLET ORAL
Qty: 0 | Refills: 0 | DISCHARGE

## 2019-06-03 RX ORDER — FLUTICASONE PROPIONATE 50 MCG
1 SPRAY, SUSPENSION NASAL
Qty: 0 | Refills: 0 | DISCHARGE

## 2019-06-03 RX ORDER — AZELASTINE 137 UG/1
2 SPRAY, METERED NASAL
Qty: 0 | Refills: 0 | DISCHARGE

## 2019-06-03 RX ORDER — BUDESONIDE, MICRONIZED 100 %
2 POWDER (GRAM) MISCELLANEOUS
Qty: 0 | Refills: 0 | DISCHARGE

## 2019-06-03 NOTE — H&P PST ADULT - RS GEN PE MLT RESP DETAILS PC
respirations non-labored/clear to auscultation bilaterally/good air movement/airway patent/no rales/no wheezes/breath sounds equal
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
## 2019-06-03 NOTE — H&P PST ADULT - NEUROLOGICAL DETAILS
sensation intact/alert and oriented x 3/responds to verbal commands/Left upper extremity, left shoulder/strength decreased

## 2019-06-03 NOTE — H&P PST ADULT - VISION (WITH CORRECTIVE LENSES IF THE PATIENT USUALLY WEARS THEM):
eye glasses / Contacts/Partially impaired: cannot see medication labels or newsprint, but can see obstacles in path, and the surrounding layout; can count fingers at arm's length

## 2019-06-03 NOTE — H&P PST ADULT - NSICDXPASTMEDICALHX_GEN_ALL_CORE_FT
PAST MEDICAL HISTORY:  Asthma STABLE, EPISODIC, Patient reports uses ProAir as needed, and Pulmocort Daily    Cervical herniated disc     Dysplasia of cervix 2016    Hypertension     Hypothyroidism     IBS (irritable bowel syndrome)     Impingement syndrome of left shoulder     Incomplete rotator cuff tear or rupture of left shoulder, not specified as traumatic     Lumbar herniated disc     Osteomyelitis of maxilla     Seasonal allergies     Tendonitis LT ACHILLES

## 2019-06-03 NOTE — H&P PST ADULT - NEGATIVE GENERAL GENITOURINARY SYMPTOMS
no nocturia/no flank pain R/no bladder infections/no hematuria/no incontinence/no urinary hesitancy/no dysuria/no flank pain L/no urine discoloration/normal urinary frequency

## 2019-06-03 NOTE — H&P PST ADULT - MUSCULOSKELETAL
details… No joint pain, swelling or deformity; no limitation of movement detailed exam no joint erythema/Left shoulder/no joint warmth/no calf tenderness/decreased ROM due to pain

## 2019-06-03 NOTE — H&P PST ADULT - ASSESSMENT
Pre op diagnosis: Incomplete rotator cuff tear or rupture of left shoulder, not specified as traumatic,  impingement syndrome of left shoulder. Patient is scheduled for Left shoulder major debridement, subacromial decompression / acromioplasty, arthroscopy rotator cuff repair scheduled on 6/11/2019.

## 2019-06-03 NOTE — H&P PST ADULT - NSICDXPROBLEM_GEN_ALL_CORE_FT
PROBLEM DIAGNOSES  Problem: Asthma  Assessment and Plan: Patient instructed to use Pro Air and Pulmicort in the AM of surgery if needed.     Problem: Snoring  Assessment and Plan: REYNA precautions, OR booking notified.      Problem: H/O multiple allergies  Assessment and Plan: OR booking notified - Allergies to Penicillin, Sulfur, latex , Contrast, iodine, shellfish    Problem: Hypothyroidism  Assessment and Plan: Patient instrcuted to take Synthroid in the AM of surgery with a sip of water.     Problem: Incomplete rotator cuff tear or rupture of left shoulder, not specified as traumatic  Assessment and Plan: Patient is scheduled for Left shoulder major debridement, subacromial decompression / acromioplasty, arthroscopy rotator cuff repair scheduled on 6/11/2019.    Preop instructions, pepcid, surgical scrub provided. Pt stated understanding, Teach back Method utilized.   Pending medical evalution per surgeon and PST - Patient with a history of Hypothyroidism, Hypertension, Asthma.   Patient with a history of Chronic pain : Instructed patient to stop CBD oil 24 hours prior to surgery. PROBLEM DIAGNOSES  Problem: Incomplete rotator cuff tear or rupture of left shoulder, not specified as traumatic  Assessment and Plan: Patient is scheduled for Left shoulder major debridement, subacromial decompression / acromioplasty, arthroscopy rotator cuff repair scheduled on 6/11/2019.    Preop instructions, pepcid, surgical scrub provided. Pt stated understanding, Teach back Method utilized.   Pending medical evaluation per surgeon and PST - Patient with a history of Hypothyroidism, Hypertension, Asthma.   Patient with a history of Chronic pain : Instructed patient to stop CBD oil 24 hours prior to surgery.       Problem: Asthma  Assessment and Plan: Patient instructed to use Pro Air and Pulmicort in the AM of surgery if needed.     Problem: Snoring  Assessment and Plan: REYNA precautions, OR booking notified.      Problem: H/O multiple allergies  Assessment and Plan: OR booking notified - Allergies to Penicillin, Sulfur, latex , Contrast, iodine, shellfish    Problem: Hypothyroidism  Assessment and Plan: Patient instrcuted to take Synthroid in the AM of surgery with a sip of water.

## 2019-06-03 NOTE — H&P PST ADULT - NEGATIVE OPHTHALMOLOGIC SYMPTOMS
no blurred vision R/no discharge R/no diplopia/no discharge L/no pain L/no photophobia/no pain R/no blurred vision L/no irritation L/no irritation R/no lacrimation L/no lacrimation R

## 2019-06-03 NOTE — H&P PST ADULT - NSICDXPASTSURGICALHX_GEN_ALL_CORE_FT
PAST SURGICAL HISTORY:  Dental implant pain, sequela     History of hysterectomy     History of surgery " Fatty tumor removed from my groin in 2018"    S/P D&C (status post dilation and curettage) 2 PAST SURGICAL HISTORY:  Dental implant pain, sequela     H/O bilateral breast reduction surgery     History of hysterectomy     History of surgery " Fatty tumor removed from my groin in 2018"    S/P D&C (status post dilation and curettage) 2

## 2019-06-03 NOTE — H&P PST ADULT - ALLERGY TYPES
outdoor environmental allergies/indoor environmental allergies/reactions to medicines/reactions to food

## 2019-06-03 NOTE — H&P PST ADULT - NEGATIVE GASTROINTESTINAL SYMPTOMS
no nausea/no vomiting/no change in bowel habits/no melena/no abdominal pain/no constipation/no diarrhea

## 2019-06-03 NOTE — H&P PST ADULT - NEGATIVE ENMT SYMPTOMS
no tinnitus/no nasal congestion/no nose bleeds/no recurrent cold sores/no gum bleeding/no throat pain/no hearing difficulty/no ear pain/no sinus symptoms/no dysphagia/no vertigo/no post-nasal discharge/no nasal discharge/no dry mouth/no nasal obstruction

## 2019-06-10 ENCOUNTER — TRANSCRIPTION ENCOUNTER (OUTPATIENT)
Age: 54
End: 2019-06-10

## 2019-06-10 RX ORDER — TRAMADOL HYDROCHLORIDE 50 MG/1
50 TABLET, COATED ORAL
Qty: 50 | Refills: 0 | Status: ACTIVE | COMMUNITY
Start: 2019-06-10 | End: 1900-01-01

## 2019-06-10 RX ORDER — ONDANSETRON 4 MG/1
4 TABLET ORAL
Qty: 20 | Refills: 0 | Status: ACTIVE | COMMUNITY
Start: 2019-06-10 | End: 1900-01-01

## 2019-06-11 ENCOUNTER — APPOINTMENT (OUTPATIENT)
Dept: ORTHOPEDIC SURGERY | Facility: AMBULATORY SURGERY CENTER | Age: 54
End: 2019-06-11

## 2019-06-11 ENCOUNTER — OUTPATIENT (OUTPATIENT)
Dept: OUTPATIENT SERVICES | Facility: HOSPITAL | Age: 54
LOS: 1 days | Discharge: HOME | End: 2019-06-11
Payer: COMMERCIAL

## 2019-06-11 VITALS
RESPIRATION RATE: 17 BRPM | DIASTOLIC BLOOD PRESSURE: 61 MMHG | HEART RATE: 54 BPM | OXYGEN SATURATION: 99 % | TEMPERATURE: 98 F | SYSTOLIC BLOOD PRESSURE: 129 MMHG | WEIGHT: 138.01 LBS | HEIGHT: 61.5 IN

## 2019-06-11 VITALS
HEART RATE: 53 BPM | DIASTOLIC BLOOD PRESSURE: 76 MMHG | TEMPERATURE: 98 F | OXYGEN SATURATION: 97 % | SYSTOLIC BLOOD PRESSURE: 154 MMHG

## 2019-06-11 DIAGNOSIS — T85.848S PAIN DUE TO OTHER INTERNAL PROSTHETIC DEVICES, IMPLANTS AND GRAFTS, SEQUELA: Chronic | ICD-10-CM

## 2019-06-11 DIAGNOSIS — M75.42 IMPINGEMENT SYNDROME OF LEFT SHOULDER: ICD-10-CM

## 2019-06-11 DIAGNOSIS — Z98.890 OTHER SPECIFIED POSTPROCEDURAL STATES: Chronic | ICD-10-CM

## 2019-06-11 DIAGNOSIS — Z90.710 ACQUIRED ABSENCE OF BOTH CERVIX AND UTERUS: Chronic | ICD-10-CM

## 2019-06-11 PROCEDURE — 29827 SHO ARTHRS SRG RT8TR CUF RPR: CPT | Mod: LT

## 2019-06-11 PROCEDURE — 29826 SHO ARTHRS SRG DECOMPRESSION: CPT | Mod: LT

## 2019-06-11 PROCEDURE — 29823 SHO ARTHRS SRG XTNSV DBRDMT: CPT | Mod: LT

## 2019-06-11 RX ORDER — ESTROGENS, CONJUGATED 0.625 MG/G
0 CREAM WITH APPLICATOR VAGINAL
Qty: 0 | Refills: 0 | DISCHARGE

## 2019-06-11 RX ORDER — MONTELUKAST 4 MG/1
1 TABLET, CHEWABLE ORAL
Qty: 0 | Refills: 0 | DISCHARGE

## 2019-06-11 RX ORDER — RANITIDINE HYDROCHLORIDE 150 MG/1
1 TABLET, FILM COATED ORAL
Qty: 0 | Refills: 0 | DISCHARGE

## 2019-06-11 RX ORDER — LEVOTHYROXINE SODIUM 125 MCG
1 TABLET ORAL
Qty: 0 | Refills: 0 | DISCHARGE

## 2019-06-11 RX ORDER — AMLODIPINE BESYLATE 2.5 MG/1
1 TABLET ORAL
Qty: 0 | Refills: 0 | DISCHARGE

## 2019-06-11 RX ORDER — AZELASTINE 137 UG/1
1 SPRAY, METERED NASAL
Qty: 0 | Refills: 0 | DISCHARGE

## 2019-06-11 RX ORDER — LORATADINE 10 MG/1
1 TABLET ORAL
Qty: 0 | Refills: 0 | DISCHARGE

## 2019-06-11 RX ORDER — BUDESONIDE, MICRONIZED 100 %
1 POWDER (GRAM) MISCELLANEOUS
Qty: 0 | Refills: 0 | DISCHARGE

## 2019-06-11 RX ORDER — FLUTICASONE PROPIONATE 50 MCG
1 SPRAY, SUSPENSION NASAL
Qty: 0 | Refills: 0 | DISCHARGE

## 2019-06-11 RX ORDER — CLONAZEPAM 1 MG
0.75 TABLET ORAL
Qty: 0 | Refills: 0 | DISCHARGE

## 2019-06-11 RX ORDER — MULTIVIT-MIN/FERROUS GLUCONATE 9 MG/15 ML
1 LIQUID (ML) ORAL
Qty: 0 | Refills: 0 | DISCHARGE

## 2019-06-11 RX ORDER — ALBUTEROL 90 UG/1
2 AEROSOL, METERED ORAL
Qty: 0 | Refills: 0 | DISCHARGE

## 2019-06-11 NOTE — ASU DISCHARGE PLAN (ADULT/PEDIATRIC) - CALL YOUR DOCTOR IF YOU HAVE ANY OF THE FOLLOWING:
Bleeding that does not stop/Numbness, tingling, color or temperature change to extremity/Fever greater than (need to indicate Fahrenheit or Celsius)/Wound/Surgical Site with redness, or foul smelling discharge or pus/Pain not relieved by Medications

## 2019-06-11 NOTE — ASU DISCHARGE PLAN (ADULT/PEDIATRIC) - NURSING INSTRUCTIONS
- wear sling at all times, even while sleeping  - you may shower 72 hours after surgery - keep arm across body as if in sling  - you may remove dressing in 72 hours - cover wounds with band-aids  - follow up appointment should be made within 7-10 days

## 2019-06-24 ENCOUNTER — APPOINTMENT (OUTPATIENT)
Dept: ORTHOPEDIC SURGERY | Facility: CLINIC | Age: 54
End: 2019-06-24
Payer: COMMERCIAL

## 2019-06-24 PROBLEM — M50.20 OTHER CERVICAL DISC DISPLACEMENT, UNSPECIFIED CERVICAL REGION: Chronic | Status: ACTIVE | Noted: 2019-06-03

## 2019-06-24 PROBLEM — J45.909 UNSPECIFIED ASTHMA, UNCOMPLICATED: Chronic | Status: ACTIVE | Noted: 2018-06-01

## 2019-06-24 PROBLEM — I10 ESSENTIAL (PRIMARY) HYPERTENSION: Chronic | Status: ACTIVE | Noted: 2019-06-03

## 2019-06-24 PROBLEM — M75.42 IMPINGEMENT SYNDROME OF LEFT SHOULDER: Chronic | Status: ACTIVE | Noted: 2019-06-03

## 2019-06-24 PROBLEM — M75.112 INCOMPLETE ROTATOR CUFF TEAR OR RUPTURE OF LEFT SHOULDER, NOT SPECIFIED AS TRAUMATIC: Chronic | Status: ACTIVE | Noted: 2019-06-03

## 2019-06-24 PROBLEM — M51.26 OTHER INTERVERTEBRAL DISC DISPLACEMENT, LUMBAR REGION: Chronic | Status: ACTIVE | Noted: 2019-06-03

## 2019-06-24 PROBLEM — E03.9 HYPOTHYROIDISM, UNSPECIFIED: Chronic | Status: ACTIVE | Noted: 2019-06-03

## 2019-06-24 PROBLEM — K58.9 IRRITABLE BOWEL SYNDROME WITHOUT DIARRHEA: Chronic | Status: ACTIVE | Noted: 2019-06-03

## 2019-06-24 PROCEDURE — 99024 POSTOP FOLLOW-UP VISIT: CPT

## 2019-06-26 NOTE — HISTORY OF PRESENT ILLNESS
[4] : the patient reports pain that is 4/10 in severity [Fever] : no fever [Chills] : no chills [Nausea] : no nausea [Vomiting] : no vomiting [Clean/Dry/Intact] : clean, dry and intact [Healed] : healed [Erythema] : not erythematous [Discharge] : absent of discharge [Swelling] : not swollen [Dehiscence] : not dehisced [Neuro Intact] : an unremarkable neurological exam [Vascular Intact] : ~T peripheral vascular exam normal [Doing Well] : is doing well [Excellent Pain Control] : has excellent pain control [No Sign of Infection] : is showing no signs of infection [Steri-Strips Removed & Replaced] : steri-strips removed and replaced [Sutures Removed] : sutures were removed [de-identified] : 52-year-old female status post left shoulder pain SAD, RTC repair 6/11/19 [de-identified] : [default value] shoulder exam\par \par Inspection: No significant ecchymosis, no residual swelling\par Skin: Incisions C/D/I, no drainage, healed\par ROM: not tested \par Painful arc ROM: not tested\par Tenderness: Tenderness throughout the shoulder girdle\par Strength: Unable to test\par Vasc: 2+ radial pulse\par Neuro: AIN, PIN, Ulnar nerve in tact to motor\par Sensation: In tact to light touch throughout\par \par  [de-identified] : 52-year-old female status post left shoulder pain SAD, RTC repair. She is doing well. She developed a rash Saturday 6/22/19  to the lateral arm and thinks it may have been due to cryocuff machine. She is not taking pain medication. Denies post op complications.  [de-identified] : 52-year-old female status post left shoulder pain SAD, RTC repair\par \par Patient presents for evaluation postoperatively for left shoulder infraspinatus repair. Complexity of the tear was discussed with the patient in detail and all intraoperative imaging was discussed. The patient voiced understanding regarding need for rotator cuff repair as well as decompression. The local rash appears benign, would recommend hydrocortisone cream.\par \par Recommendations for PT were made. Continue ice/brace use.\par \par Followup 4 weeks

## 2019-06-26 NOTE — HISTORY OF PRESENT ILLNESS
[4] : the patient reports pain that is 4/10 in severity [Chills] : no chills [Fever] : no fever [Nausea] : no nausea [Vomiting] : no vomiting [Clean/Dry/Intact] : clean, dry and intact [Healed] : healed [Erythema] : not erythematous [Discharge] : absent of discharge [Swelling] : not swollen [Neuro Intact] : an unremarkable neurological exam [Dehiscence] : not dehisced [Vascular Intact] : ~T peripheral vascular exam normal [Doing Well] : is doing well [Excellent Pain Control] : has excellent pain control [No Sign of Infection] : is showing no signs of infection [Steri-Strips Removed & Replaced] : steri-strips removed and replaced [Sutures Removed] : sutures were removed [de-identified] : 52-year-old female status post left shoulder pain SAD, RTC repair 6/11/19 [de-identified] : 52-year-old female status post left shoulder pain SAD, RTC repair. She is doing well. She developed a rash Saturday 6/22/19  to the lateral arm and thinks it may have been due to cryocuff machine. She is not taking pain medication. Denies post op complications.  [de-identified] : [default value] shoulder exam\par \par Inspection: No significant ecchymosis, no residual swelling\par Skin: Incisions C/D/I, no drainage, healed\par ROM: not tested \par Painful arc ROM: not tested\par Tenderness: Tenderness throughout the shoulder girdle\par Strength: Unable to test\par Vasc: 2+ radial pulse\par Neuro: AIN, PIN, Ulnar nerve in tact to motor\par Sensation: In tact to light touch throughout\par \par  [de-identified] : 52-year-old female status post left shoulder pain SAD, RTC repair\par \par Patient presents for evaluation postoperatively for left shoulder infraspinatus repair. Complexity of the tear was discussed with the patient in detail and all intraoperative imaging was discussed. The patient voiced understanding regarding need for rotator cuff repair as well as decompression. The local rash appears benign, would recommend hydrocortisone cream.\par \par Recommendations for PT were made. Continue ice/brace use.\par \par Followup 4 weeks

## 2019-07-10 ENCOUNTER — APPOINTMENT (OUTPATIENT)
Dept: ORTHOPEDIC SURGERY | Facility: CLINIC | Age: 54
End: 2019-07-10
Payer: COMMERCIAL

## 2019-07-10 VITALS — SYSTOLIC BLOOD PRESSURE: 127 MMHG | HEART RATE: 84 BPM | DIASTOLIC BLOOD PRESSURE: 80 MMHG

## 2019-07-10 PROCEDURE — 99024 POSTOP FOLLOW-UP VISIT: CPT

## 2019-07-12 NOTE — HISTORY OF PRESENT ILLNESS
[Clean/Dry/Intact] : clean, dry and intact [Healed] : healed [Neuro Intact] : an unremarkable neurological exam [Vascular Intact] : ~T peripheral vascular exam normal [Doing Well] : is doing well [Excellent Pain Control] : has excellent pain control [No Sign of Infection] : is showing no signs of infection [7] : the patient reports pain that is 7/10 in severity [Chills] : no chills [Fever] : no fever [Nausea] : no nausea [Vomiting] : no vomiting [Erythema] : not erythematous [Discharge] : absent of discharge [Swelling] : swollen [Dehiscence] : not dehisced [de-identified] : 53-year-old female status post left shoulder pain SAD, RTC repair 6/11/19 [de-identified] : 53-year-old female status post left shoulder pain SAD, RTC repair. She is doing well. She is Attending PT 3 x per week. She noticed swelling in the shoulder 1 week ago and PT asked for reevaluation.  She is taking Tramadol for pain. Denies post op complications.  [de-identified] : Left shoulder exam\par \par Inspection: No significant ecchymosis, Mild swelling to deltoid insertion\par Skin: Incisions C/D/I, no drainage, healed\par ROM: not tested \par Painful arc ROM: not tested\par Tenderness: Tenderness throughout the shoulder girdle\par Strength: Unable to test\par Vasc: 2+ radial pulse\par Neuro: AIN, PIN, Ulnar nerve in tact to motor\par Sensation: In tact to light touch throughout\par \par  [de-identified] : 53-year-old female status post left shoulder pain SAD, RTC repair\par \par Discussed that the swelling seen at the deltoid insertion is normal following shoulder arthroscopy especially after rotator cuff repair. Nonconcerning.\par \par Patient again counseled on HEP as well as progressions with physical therapy.\par \par Recommendations for PT were made. May discontinue abduction brace and utilize sling at this time. \par \par Followup 2 weeks

## 2019-07-22 ENCOUNTER — APPOINTMENT (OUTPATIENT)
Dept: ORTHOPEDIC SURGERY | Facility: CLINIC | Age: 54
End: 2019-07-22
Payer: COMMERCIAL

## 2019-07-22 DIAGNOSIS — S46.012D STRAIN OF MUSCLE(S) AND TENDON(S) OF THE ROTATOR CUFF OF LEFT SHOULDER, SUBSEQUENT ENCOUNTER: ICD-10-CM

## 2019-07-22 PROCEDURE — 99024 POSTOP FOLLOW-UP VISIT: CPT

## 2019-07-22 NOTE — HISTORY OF PRESENT ILLNESS
[7] : the patient reports pain that is 7/10 in severity [Clean/Dry/Intact] : clean, dry and intact [Healed] : healed [Swelling] : swollen [Vascular Intact] : ~T peripheral vascular exam normal [Neuro Intact] : an unremarkable neurological exam [Excellent Pain Control] : has excellent pain control [Doing Well] : is doing well [No Sign of Infection] : is showing no signs of infection [Chills] : no chills [Fever] : no fever [Nausea] : no nausea [Erythema] : not erythematous [Discharge] : absent of discharge [Vomiting] : no vomiting [Dehiscence] : not dehisced [de-identified] : 53-year-old female status post left shoulder pain SAD, RTC repair 6/11/19 [de-identified] : Left shoulder exam\par \par Inspection: Swelling to deltoid insertion\par Skin: Incisions C/D/I, no drainage, healed\par AROM: FF 80, AB 50, ER 10, IR waist\par Painful arc ROM: Further PROM\par Tenderness: Tenderness throughout the shoulder girdle\par Strength: 3/5 ER, 4/5 IR\par Vasc: 2+ radial pulse\par Neuro: AIN, PIN, Ulnar nerve in tact to motor\par Sensation: In tact to light touch throughout\par  [de-identified] : 53-year-old female status post left shoulder pain SAD, RTC repair. She is doing well. She is Attending PT 3 x per week. She reports continuation of swelling in the lateral shoulder.  She is taking Tramadol for pain. Denies post op complications.  [de-identified] : 53-year-old female status post left shoulder pain SAD, RTC repair\par \par Continued swelling to the lateral arm consistent with rotator cuff repair and arthroscopy. Patient's range of motion is improving passively. Patient is doing an exercise program with moderate improvements from prior visit. Discussion was had with the patient regarding continued physical therapy, discontinuation of brace, and modalities as needed.\par \par Followup 6 weeks

## 2019-07-25 ENCOUNTER — RX RENEWAL (OUTPATIENT)
Age: 54
End: 2019-07-25

## 2019-07-25 RX ORDER — TRAMADOL HYDROCHLORIDE 50 MG/1
50 TABLET, COATED ORAL
Qty: 20 | Refills: 0 | Status: ACTIVE | COMMUNITY
Start: 2019-07-25 | End: 1900-01-01

## 2020-02-28 DIAGNOSIS — A09 INFECTIOUS GASTROENTERITIS AND COLITIS, UNSPECIFIED: ICD-10-CM

## 2020-06-05 NOTE — H&P PST ADULT - NEGATIVE NEUROLOGICAL SYMPTOMS
1500ml
no confusion/no paresthesias/no loss of consciousness/no vertigo/no difficulty walking/no loss of sensation/no headache/no transient paralysis/no generalized seizures/no focal seizures/no tremors/no weakness

## 2022-09-28 NOTE — H&P PST ADULT - BP NONINVASIVE SYSTOLIC (MM HG)
History of Present Illness:  49 year old female here today for complete physical exam.    Overall health: very good    Acute issues: Has been having intermittent heart palpitations. No chest pain, shortness of breath, or lightheadedness, but having them most days during the week. No racing thoughts or feelings of impending doom. No issues taking medications, feeling well otherwise, no recent illness.    Mood: no depressed thoughts or lack of interest  Sleep: no issues, sleeps well  Stress: manageable  Work: care giver  Diet: well balanced, no restrictions  Exercise: active at work, no specific routine  Life and family: , doing well    Past Medical History:   Diagnosis Date   • Abnormal uterine bleeding 03/30/2016   • Atypical ductal hyperplasia of left breast 11/18/2020   • Bronchitis    • Closed nondisplaced dome fracture of right talus 04/20/2016   • Essential hypertension 12/04/2020   • Gastroesophageal reflux disease    • High ankle sprain of right lower extremity 04/20/2016   • Sprain of deltoid ligament of right ankle 04/20/2016   • Uterine leiomyoma 03/30/2016     Past Surgical History:   Procedure Laterality Date   • Breast surgery Left 11/30/2020    biopsy   • Hysterectomy  04/08/2019   • Tubal ligation       History reviewed. No pertinent family history.  Social History     Tobacco Use   • Smoking status: Never Smoker   • Smokeless tobacco: Never Used   Vaping Use   • Vaping Use: never used   Substance Use Topics   • Alcohol use: Not Currently     Alcohol/week: 0.0 standard drinks     Comment: very rare   • Drug use: No       Current Outpatient Medications   Medication Sig Dispense Refill   • omeprazole (PriLOSEC) 40 MG capsule Take 1 capsule by mouth daily. 90 capsule 3   • hydroCHLOROthiazide (HYDRODIURIL) 12.5 MG tablet Take 1 tablet by mouth daily. 90 tablet 3   • nystatin (MYCOSTATIN) 727746 UNIT/ML suspension Take 1 mL by mouth 4 times daily. Use 1 mL by mouth 4 times daily until clear 60 mL 0    • naproxen sodium (ALEVE) 220 MG tablet Take 220 mg by mouth in the morning and 220 mg in the evening. Take with meals.     • cetirizine (ZyrTEC Allergy) 10 MG tablet Take 1 tablet by mouth daily. 30 tablet 0   • acetaminophen (TYLENOL) 650 MG CR tablet Take 650 mg by mouth every 8 hours as needed.       No current facility-administered medications for this visit.     Patient's medications were reviewed and updated as noted in the chart.    Review of Systems:  Review of Systems   Constitutional: Negative for activity change, appetite change, chills, fatigue and fever.   HENT: Negative for congestion, hearing loss, postnasal drip, rhinorrhea, sinus pressure, sore throat and trouble swallowing.    Eyes: Negative for pain.   Respiratory: Negative for apnea, cough, choking, chest tightness and shortness of breath.    Cardiovascular: Positive for palpitations. Negative for chest pain and leg swelling.   Gastrointestinal: Negative for abdominal distention, abdominal pain, blood in stool, constipation, diarrhea, nausea and vomiting.   Genitourinary: Negative for dysuria, flank pain and frequency.   Musculoskeletal: Negative for arthralgias, back pain and myalgias.   Skin: Negative for rash.   Neurological: Negative for dizziness, light-headedness and headaches.   Hematological: Does not bruise/bleed easily.   Psychiatric/Behavioral: Negative for decreased concentration, dysphoric mood and sleep disturbance. The patient is not nervous/anxious.        Objective:  Vital Signs Reviewed per chart  Physical Exam  Constitutional:       General: She is not in acute distress.     Appearance: Normal appearance. She is well-developed. She is obese. She is not ill-appearing or toxic-appearing.   HENT:      Head: Normocephalic and atraumatic.      Right Ear: Tympanic membrane, ear canal and external ear normal. There is no impacted cerumen.      Left Ear: Tympanic membrane, ear canal and external ear normal. There is no impacted  cerumen.      Nose: Nose normal. No congestion.      Mouth/Throat:      Mouth: Mucous membranes are moist.      Pharynx: Oropharynx is clear. No posterior oropharyngeal erythema.      Comments: Oral thrush     Neck: Normal range of motion and neck supple.   Eyes:      General: No scleral icterus.        Right eye: No discharge.         Left eye: No discharge.      Extraocular Movements: Extraocular movements intact.      Conjunctiva/sclera: Conjunctivae normal.      Pupils: Pupils are equal, round, and reactive to light.   Cardiovascular:      Rate and Rhythm: Normal rate and regular rhythm.      Heart sounds: Normal heart sounds. No murmur heard.    No friction rub. No gallop.   Pulmonary:      Effort: Pulmonary effort is normal. No respiratory distress.      Breath sounds: Normal breath sounds. No stridor. No wheezing or rales.   Chest:      Chest wall: No tenderness.   Abdominal:      General: Bowel sounds are normal. There is no distension.      Palpations: Abdomen is soft. There is no mass.      Tenderness: There is no abdominal tenderness. There is no guarding or rebound.   Musculoskeletal:         General: No swelling. Normal range of motion.      Right lower leg: No edema.      Left lower leg: No edema.   Lymphadenopathy:      Cervical: No cervical adenopathy.   Skin:     General: Skin is warm and dry.      Findings: No rash.   Neurological:      General: No focal deficit present.      Mental Status: She is alert and oriented to person, place, and time.      Motor: No weakness.      Gait: Gait normal.   Psychiatric:         Mood and Affect: Mood normal.         Behavior: Behavior normal.         Judgment: Judgment normal.          Health Maintenance Summary     Influenza Vaccine (1)  Due since 9/1/2022    Breast Cancer Screening (Yearly)  Next due on 3/11/2023    Depression Screening (Yearly)  Next due on 9/23/2023    DTaP/Tdap/Td Vaccine (2 - Td or Tdap)  Next due on 5/25/2027    Colorectal Cancer Screen-  (Colonoscopy - Every 10 Years)  Next due on 8/31/2032    Hepatitis B Vaccine   Completed    COVID-19 Vaccine   Completed    Meningococcal Vaccine   Aged Out    HPV Vaccine   Aged Out    Pneumococcal Vaccine 0-64   Aged Out          Assessment/Plan:  Problem List Items Addressed This Visit     Gastroesophageal reflux disease     Stable, continue PPI as needed         Relevant Medications    omeprazole (PriLOSEC) 40 MG capsule    Essential hypertension     Well controlled, continue current regimen, continue to check twice weekly, healthy diet and exercise, low salt diet         Relevant Medications    hydroCHLOROthiazide (HYDRODIURIL) 12.5 MG tablet    Health care maintenance     Up to date on screenings as above  Discussed maintaining healthy diet and exercise (150 mins/week), weight as below  No difficulty with stress or sleep, mood stable  Screening labs ordered  Up to date on cancer screening         Class 2 severe obesity due to excess calories with serious comorbidity and body mass index (BMI) of 36.0 to 36.9 in adult (CMS/[facility])     Stable, discussed importance of weight loss for control of chronic conditions such as blood pressure as well as prevention of further complications to health. Focus on portion control by increasing water, green vegetables and eating on salad plates. Calorie counting and food journaling can also help with weight loss and combine with increasing activity level to help with weight loss. Follow-up in 1 year, sooner if wanting to discuss alternatives such as surgery or medication.           Other Visit Diagnoses     Annual physical exam    -  Primary    Relevant Orders    THYROID STIMULATING HORMONE REFLEX (Completed)    Heart palpitations        Check holter monitor    Relevant Orders    HOLTER MONITOR    Oral candidiasis        Oral swish and spit    Relevant Medications    nystatin (MYCOSTATIN) [id number] UNIT/ML suspension        Recent Labs and Imaging reviewed with patient to ensure  understanding.  Patient educated on medications; timing and dosages reviewed.    Praneeth Ho,    118

## 2023-02-09 ENCOUNTER — NON-APPOINTMENT (OUTPATIENT)
Age: 58
End: 2023-02-09

## 2023-05-25 NOTE — ASU PATIENT PROFILE, ADULT - CENTRAL VENOUS CATHETER
Reviewed and accepted note.  Alert and cooperative    Encounter to monitor ocular implications of diabetes    -burning, -itching, -tearing, -flashes/floater, -headache, -diplopia    Hemoglobin A1C (%)   Date Value   04/07/2023 6.2 (H)     Dilated fundus exam performed. Diabetes without ocular complication. Explained risk for glaucoma, cataract, retinopathy and benefits of diet, exercise and goal of A1C less than 7.0.  Annual observation advised.   Negative macular edema.     Cataract.  Discussed natural course and future benefits of extraction.    Did not refract 2023   no

## 2023-07-05 NOTE — PACU DISCHARGE NOTE - THE ANESTHESIA ORDERS USED IN THE PACU ORDER SET WILL BE DISCONTINUED UPON TRANSFER OF THIS PATIENT
Statement Selected Detail Level: Detailed Initiate Treatment: Doxycycline 100mg Render In Strict Bullet Format?: No

## 2023-12-25 ENCOUNTER — NON-APPOINTMENT (OUTPATIENT)
Age: 58
End: 2023-12-25